# Patient Record
Sex: MALE | Race: WHITE | NOT HISPANIC OR LATINO | URBAN - METROPOLITAN AREA
[De-identification: names, ages, dates, MRNs, and addresses within clinical notes are randomized per-mention and may not be internally consistent; named-entity substitution may affect disease eponyms.]

---

## 2023-09-26 ENCOUNTER — HOSPITAL ENCOUNTER (EMERGENCY)
Facility: MEDICAL CENTER | Age: 48
End: 2023-09-26
Attending: EMERGENCY MEDICINE
Payer: COMMERCIAL

## 2023-09-26 ENCOUNTER — APPOINTMENT (OUTPATIENT)
Dept: RADIOLOGY | Facility: MEDICAL CENTER | Age: 48
End: 2023-09-26
Attending: EMERGENCY MEDICINE
Payer: COMMERCIAL

## 2023-09-26 VITALS
HEART RATE: 68 BPM | BODY MASS INDEX: 33.09 KG/M2 | HEIGHT: 69 IN | WEIGHT: 223.4 LBS | TEMPERATURE: 98.2 F | DIASTOLIC BLOOD PRESSURE: 99 MMHG | OXYGEN SATURATION: 98 % | SYSTOLIC BLOOD PRESSURE: 144 MMHG | RESPIRATION RATE: 16 BRPM

## 2023-09-26 DIAGNOSIS — S49.92XA INJURY OF LEFT SHOULDER, INITIAL ENCOUNTER: ICD-10-CM

## 2023-09-26 PROCEDURE — 99284 EMERGENCY DEPT VISIT MOD MDM: CPT

## 2023-09-26 PROCEDURE — 73030 X-RAY EXAM OF SHOULDER: CPT | Mod: LT

## 2023-09-26 RX ORDER — IBUPROFEN 600 MG/1
600 TABLET ORAL EVERY 8 HOURS PRN
Qty: 20 TABLET | Refills: 0 | Status: SHIPPED | OUTPATIENT
Start: 2023-09-26

## 2023-09-26 ASSESSMENT — PAIN DESCRIPTION - PAIN TYPE: TYPE: ACUTE PAIN

## 2023-09-27 NOTE — DISCHARGE INSTRUCTIONS
Please schedule a follow-up appointment with the orthopedic clinic listed above.  Call tomorrow morning to schedule that appointment.  Return to the emergency department if you develop any new or worsening symptoms including worsening pain, swelling, numbness or tingling, or any further concerns.

## 2023-09-27 NOTE — ED NOTES
Pt reports they fell in the shower this morning, denies hitting their head, blood thinners or LOC. Pt reports they fell on their left shoulder and when driving today they pain got worse while driving. Pt has hx of HTN and non compliant with medication d/t recent move. Pt reports its been more than a month since they have taken their medication.  Pt is alert and oriented, speaking in full sentences and denies other needs at this time. Pt states they just want to make sure nothing is wrong with their shoulder.

## 2023-09-27 NOTE — ED TRIAGE NOTES
Chief Complaint   Patient presents with    Shoulder Injury     Pt slipped in the shower this morning and fell onto his left shoulder. Pt is left handed and throughout the day realized pain has increased.   Pt has full ROM but has pain with movement.   Positive head injury with no LOC. Has a headache but no N/V, no blurry vision.

## 2023-09-27 NOTE — ED NOTES
Pt discharged home with instructions to follow up with ortho.  Pt educated on new prescription medications and where to pick them up. The pt denies questions at this time.  Pt instructed to come back to the ER if symptoms worsen or they feel they are having a medical emergency.  Pt is alert and oriented, speaking in full sentences. Pt ambulates to the waiting area without incident.

## 2023-09-27 NOTE — ED PROVIDER NOTES
"Emergency Physician Note    Chief Concern:  Chief Complaint   Patient presents with    Shoulder Injury     Pt slipped in the shower this morning and fell onto his left shoulder. Pt is left handed and throughout the day realized pain has increased.   Pt has full ROM but has pain with movement.   Positive head injury with no LOC. Has a headache but no N/V, no blurry vision.          Limitation to History:  None    HPI/ROS   Outside Historians:   None     External Records Reviewed:   No prior medical records available for review  HPI:  Nish Infante is a 48 y.o. male who presents to the emergency department today for evaluation of left shoulder pain.  He fell this morning in the bathroom, striking his left shoulder.  Since that time he has had progressively worsening pain and mild swelling.  He states that he is able to range the shoulder, the range of motion is somewhat limited by pain.  He was concerned about a possible fracture, so he presented to the emergency department for further evaluation.  He has no paresthesias, no numbness or tingling, no weakness, no difficulty grasping objects.  He reports no significant past medical history.    PAST MEDICAL HISTORY  No past medical history on file.    SURGICAL HISTORY  No past surgical history on file.    FAMILY HISTORY  No family history on file.    SOCIAL HISTORY   reports that he has never smoked. He has never used smokeless tobacco. He reports that he does not currently use alcohol. He reports that he does not currently use drugs.    CURRENT MEDICATIONS  Discharge Medication List as of 9/26/2023  8:48 PM          ALLERGIES  Patient has no allergy information on record.    PHYSICAL EXAM  Physical Exam:  Vital Signs: BP (!) 144/99   Pulse 68   Temp 36.8 °C (98.2 °F) (Temporal)   Resp 16   Ht 1.753 m (5' 9\")   Wt 101 kg (223 lb 6.4 oz)   SpO2 98%   BMI 32.99 kg/m²   Constitutional: Alert, no acute distress  HENT: Atraumatic  Neck: Normal range of " motion  Cardiovascular: Left upper extremity warm, well perfused, 2+ radial pulse  Pulmonary: Normal work of breathing  Skin: Left shoulder with normal appearing overlying skin, no redness, no cellulitis, no evidence of abscess, no lacerations nor abrasions.  No localizable tenderness to palpation overlying the clavicle, nor AC joint.  Musculoskeletal: Left shoulder with near full active range of motion  Neurologic: Left shoulder with sensory and motor function intact in radial, median, and ulnar nerve distributions    Diagnostic Studies & Procedures    Radiology:  The attending Emergency Physician has independently interpreted the following imaging:  I independently interpreted the plain film of the shoulder, no acute bony injury identified.    DX-SHOULDER 2+ LEFT   Final Result         1.  No acute traumatic bony injury.             Course and Medical Decision Making    ED Observation Status? No; Patient does not meet criteria for ED Observation.     Initial Assessment and Plan  Mr. Infante presents to the emergency department today for evaluation of left shoulder injury as documented above.  He fell in the shower, striking the left shoulder.  Sensorimotor function is intact, though motor function is somewhat limited by pain.  He does have good range of motion which is less concerning for dislocation on physical exam.  No evidence of neurovascular compromise.  Soft compartments throughout the upper extremity.    Plain film is negative for acute traumatic bony injury.  At this time I do not believe he requires any further emergent diagnostics nor treatment.  He should follow-up closely with orthopedics for complete recheck, and further diagnostics or treatment as necessary.  He is provided with follow-up information for Dr. Null, orthopedic surgeon on-call this evening.  He will call tomorrow morning to schedule that appointment. Return precautions were discussed with the patient, and provided in written form with  the patient's discharge instructions.     Additional Problems and Disposition    Decision tools and prescription drugs considered including, but not limited to:   1.  Opiate pain medications -opiate pain medications were initially considered, however in absence of acute fracture, do not believe opiate pain medications are necessary.  Plan is for first-line therapy of nonsteroidal anti-inflammatories.  Believe risks of opiate pain medications outweigh benefits at this time.    Disposition:  Discharged in stable condition    FINAL IMPRESSION   1. Injury of left shoulder, initial encounter        The note accurately reflects work and decisions made by me.  Jenifer Jaime M.D.  9/27/2023  9:00 PM